# Patient Record
Sex: FEMALE | URBAN - METROPOLITAN AREA
[De-identification: names, ages, dates, MRNs, and addresses within clinical notes are randomized per-mention and may not be internally consistent; named-entity substitution may affect disease eponyms.]

---

## 2022-02-04 ENCOUNTER — OFFICE VISIT (OUTPATIENT)
Dept: URBAN - METROPOLITAN AREA CLINIC 63 | Facility: CLINIC | Age: 72
End: 2022-02-04
Payer: MEDICARE

## 2022-02-04 DIAGNOSIS — H25.13 AGE-RELATED NUCLEAR CATARACT, BILATERAL: ICD-10-CM

## 2022-02-04 DIAGNOSIS — H02.055 TRICHIASIS WITHOUT ENTROPION OF LEFT LOWER EYELID: Primary | ICD-10-CM

## 2022-02-04 DIAGNOSIS — H04.123 DRY EYE SYNDROME OF BILATERAL LACRIMAL GLANDS: ICD-10-CM

## 2022-02-04 PROCEDURE — 92004 COMPRE OPH EXAM NEW PT 1/>: CPT | Performed by: OPTOMETRIST

## 2022-02-04 ASSESSMENT — VISUAL ACUITY
OS: 20/40
OD: 20/30

## 2022-02-04 ASSESSMENT — INTRAOCULAR PRESSURE
OS: 16
OD: 15

## 2022-02-04 NOTE — IMPRESSION/PLAN
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 Plan: Removed with forceps. Continue to observe.